# Patient Record
Sex: FEMALE | Race: WHITE
[De-identification: names, ages, dates, MRNs, and addresses within clinical notes are randomized per-mention and may not be internally consistent; named-entity substitution may affect disease eponyms.]

---

## 2017-09-14 ENCOUNTER — HOSPITAL ENCOUNTER (OUTPATIENT)
Dept: HOSPITAL 53 - M OPP | Age: 67
Discharge: HOME | End: 2017-09-14
Attending: SURGERY
Payer: MEDICARE

## 2017-09-14 VITALS — DIASTOLIC BLOOD PRESSURE: 80 MMHG | SYSTOLIC BLOOD PRESSURE: 113 MMHG

## 2017-09-14 VITALS — HEIGHT: 64 IN | WEIGHT: 170 LBS | BODY MASS INDEX: 29.02 KG/M2

## 2017-09-14 DIAGNOSIS — K64.2: ICD-10-CM

## 2017-09-14 DIAGNOSIS — K64.1: ICD-10-CM

## 2017-09-14 DIAGNOSIS — D12.7: ICD-10-CM

## 2017-09-14 DIAGNOSIS — Z86.19: ICD-10-CM

## 2017-09-14 DIAGNOSIS — Z80.8: ICD-10-CM

## 2017-09-14 DIAGNOSIS — Z12.11: Primary | ICD-10-CM

## 2017-09-14 DIAGNOSIS — D12.0: ICD-10-CM

## 2017-09-14 DIAGNOSIS — Z79.899: ICD-10-CM

## 2017-09-14 DIAGNOSIS — Z78.0: ICD-10-CM

## 2017-09-14 DIAGNOSIS — K57.30: ICD-10-CM

## 2017-09-14 DIAGNOSIS — Z87.891: ICD-10-CM

## 2017-09-14 DIAGNOSIS — Z80.49: ICD-10-CM

## 2017-09-14 NOTE — ROOR
________________________________________________________________________________

Patient Name: Emily Mcbride               Procedure Date: 9/14/2017 10:34 AM

MRN: [id number]                          Account Number: [id number]

YOB: 1950                Age: 67

Room: [facility]                            Gender: Female

Note Status: Finalized                 

________________________________________________________________________________

 

Procedure:           Colonoscopy

Indications:         Screening for colorectal malignant neoplasm

Providers:           Leo J. Gosselin Jr, MD

Referring MD:        GAGANDEEP BURGER DO

Requesting Provider: 

Medicines:           Propofol per Anesthesia

Complications:       No immediate complications.

________________________________________________________________________________

Procedure:           Pre-Anesthesia Assessment:

                     - Prior to the procedure, a History and Physical was 

                     performed, and patient medications and allergies were 

                     reviewed. The patient is competent. The risks and 

                     benefits of the procedure and the sedation options and 

                     risks were discussed with the patient. All questions were 

                     answered and informed consent was obtained. Patient 

                     identification and proposed procedure were verified by 

                     the physician and the nurse in the pre-procedure area and 

                     in the procedure room. Mental Status Examination: alert 

                     and oriented. Airway Examination: normal oropharyngeal 

                     airway and neck mobility. Respiratory Examination: clear 

                     to auscultation. CV Examination: normal. ASA Grade 

                     Assessment: II - A patient with mild systemic disease. 

                     After reviewing the risks and benefits, the patient was 

                     deemed in satisfactory condition to undergo the 

                     procedure. The anesthesia plan was to use moderate 

                     sedation / analgesia (conscious sedation). Immediately 

                     prior to administration of medications, the patient was 

                     re-assessed for adequacy to receive sedatives. The heart 

                     rate, respiratory rate, oxygen saturations, blood 

                     pressure, adequacy of pulmonary ventilation, and response 

                     to care were monitored throughout the procedure. The 

                     physical status of the patient was re-assessed after the 

                     procedure.

                     The Colonoscope was introduced through the anus and 

                     advanced to the cecum, identified by appendiceal orifice 

                     and ileocecal valve. The colonoscopy was performed 

                     without difficulty. The patient tolerated the procedure 

                     well. The quality of the bowel preparation was adequate 

                     and good.

                                                                                

Findings:

     The perianal exam findings include non-thrombosed internal hemorrhoids, 

     internal hemorrhoids that prolapse with straining, but spontaneously 

     regress to the resting position (Grade II) and internal hemorrhoids that 

     prolapse with straining, but require manual replacement into the anal 

     canal (Grade III).

     Multiple small-mouthed diverticula were found in the sigmoid colon.

     Multiple small and large-mouthed diverticula were found in the transverse 

     colon and ascending colon.

     A medium polyp was found in the ileocecal valve. The polyp was 

     hyperplastic and sessile. The polyp was removed with a hot snare. 

     Resection and retrieval were complete.

     A medium polyp was found in the recto-sigmoid colon. The polyp was 

     removed with a hot snare. Resection and retrieval were complete.

     The rectum, descending colon, cecum and appendiceal orifice appeared 

     normal.

                                                                                

Impression:          - Non-thrombosed internal hemorrhoids, internal 

                     hemorrhoids that prolapse with straining, but 

                     spontaneously regress to the resting position (Grade II) 

                     and internal hemorrhoids that prolapse with straining, 

                     but require manual replacement into the anal canal (Grade 

                     III) found on perianal exam.

                     - Diverticulosis in the sigmoid colon.

                     - Diverticulosis in the transverse colon and in the 

                     ascending colon.

                     - One medium polyp at the ileocecal valve, removed with a 

                     hot snare. Resected and retrieved.

                     - One medium polyp at the recto-sigmoid colon, removed 

                     with a hot snare. Resected and retrieved.

                     - The rectum, descending colon, cecum and appendiceal 

                     orifice are normal.

Recommendation:      - Discharge patient to home (ambulatory).

                     - Repeat colonoscopy in 3 - 5 years for surveillance 

                     based on pathology results.

                                                                                

 

Leo Gosselin MD

_____________________

Leo J Gosselin Jr, MD

9/14/2017 11:14:08 AM

This report has been signed electronically.

Number of Addenda: 0

 

Note Initiated On: 9/14/2017 10:34 AM

Estimated Blood Loss:

     Estimated blood loss: none.

## 2017-12-28 ENCOUNTER — HOSPITAL ENCOUNTER (OUTPATIENT)
Dept: HOSPITAL 53 - M RAD | Age: 67
End: 2017-12-28
Attending: NURSE PRACTITIONER
Payer: MEDICARE

## 2017-12-28 DIAGNOSIS — Z12.31: Primary | ICD-10-CM

## 2018-03-02 ENCOUNTER — HOSPITAL ENCOUNTER (OUTPATIENT)
Dept: HOSPITAL 53 - M RAD | Age: 68
End: 2018-03-02
Attending: NURSE PRACTITIONER
Payer: MEDICARE

## 2018-03-02 DIAGNOSIS — N63.10: Primary | ICD-10-CM

## 2018-03-02 PROCEDURE — 77065 DX MAMMO INCL CAD UNI: CPT

## 2020-11-01 ENCOUNTER — HOSPITAL ENCOUNTER (OUTPATIENT)
Dept: HOSPITAL 53 - M LABSMTC | Age: 70
End: 2020-11-01
Attending: PEDIATRICS
Payer: SELF-PAY

## 2020-11-01 DIAGNOSIS — Z20.828: Primary | ICD-10-CM

## 2021-11-18 ENCOUNTER — HOSPITAL ENCOUNTER (OUTPATIENT)
Dept: HOSPITAL 53 - M LABSMTC | Age: 71
End: 2021-11-18
Attending: ANESTHESIOLOGY
Payer: MEDICARE

## 2021-11-18 DIAGNOSIS — Z20.822: ICD-10-CM

## 2021-11-18 DIAGNOSIS — Z01.812: Primary | ICD-10-CM

## 2021-11-23 ENCOUNTER — HOSPITAL ENCOUNTER (OUTPATIENT)
Dept: HOSPITAL 53 - M OPP | Age: 71
Discharge: HOME | End: 2021-11-23
Attending: SURGERY
Payer: MEDICARE

## 2021-11-23 VITALS — HEIGHT: 65 IN | BODY MASS INDEX: 31.49 KG/M2 | WEIGHT: 189 LBS

## 2021-11-23 VITALS — SYSTOLIC BLOOD PRESSURE: 135 MMHG | DIASTOLIC BLOOD PRESSURE: 68 MMHG

## 2021-11-23 DIAGNOSIS — Z79.899: ICD-10-CM

## 2021-11-23 DIAGNOSIS — K57.30: Primary | ICD-10-CM

## 2021-11-23 DIAGNOSIS — Z86.010: ICD-10-CM

## 2021-11-23 NOTE — ROOR
________________________________________________________________________________

Patient Name: Emily Mcbride               Procedure Date: 11/23/2021 11:48 AM

MRN: [id number]                          Account Number: [id number]

YOB: 1950                Age: 71

Room: [facility]                            Gender: Female

Note Status: Finalized                 

________________________________________________________________________________

 

Procedure:            Colonoscopy

Indications:          High risk colon cancer surveillance: Personal history of 

                      colonic polyps

Providers:            Leo J. Gosselin Jr, MD

Referring MD:         GAGANDEEP BURGER DO

Requesting Provider:  

Medicines:            Propofol per Anesthesia

Complications:        No immediate complications.

________________________________________________________________________________

Procedure:            Pre-Anesthesia Assessment:

                      - Prior to the procedure, a History and Physical was 

                      performed, and patient medications and allergies were 

                      reviewed. The patient is competent. The risks and 

                      benefits of the procedure and the sedation options and 

                      risks were discussed with the patient. All questions 

                      were answered and informed consent was obtained. Patient 

                      identification and proposed procedure were verified by 

                      the physician and the nurse in the pre-procedure area 

                      and in the procedure room. Mental Status Examination: 

                      alert and oriented. Airway Examination: normal 

                      oropharyngeal airway and neck mobility. Respiratory 

                      Examination: clear to auscultation. CV Examination: 

                      normal. ASA Grade Assessment: II - A patient with mild 

                      systemic disease. After reviewing the risks and 

                      benefits, the patient was deemed in satisfactory 

                      condition to undergo the procedure. The anesthesia plan 

                      was to use moderate sedation / analgesia (conscious 

                      sedation). Immediately prior to administration of 

                      medications, the patient was re-assessed for adequacy to 

                      receive sedatives. The heart rate, respiratory rate, 

                      oxygen saturations, blood pressure, adequacy of 

                      pulmonary ventilation, and response to care were 

                      monitored throughout the procedure. The physical status 

                      of the patient was re-assessed after the procedure.

                      The Colonoscope was introduced through the anus and 

                      advanced to the cecum, identified by appendiceal orifice 

                      and ileocecal valve. The colonoscopy was performed 

                      without difficulty. The patient tolerated the procedure 

                      well. The quality of the bowel preparation was adequate.

                                                                                

Findings:

     The rectum, recto-sigmoid colon, descending colon, transverse colon, 

     cecum, appendiceal orifice and ileocecal valve appeared normal.

     Multiple small and large-mouthed diverticula were found in the sigmoid 

     colon and ascending colon.

                                                                                

Impression:           - The rectum, recto-sigmoid colon, descending colon, 

                      transverse colon, cecum, appendiceal orifice and 

                      ileocecal valve are normal.

                      - Diverticulosis in the sigmoid colon and in the 

                      ascending colon.

                      - No specimens collected.

Recommendation:       - Discharge patient to home (ambulatory).

                      - Repeat colonoscopy in 5 years for surveillance.

                                                                                

Procedure Code(s):    --- Professional ---

                      55552, Colonoscopy, flexible; diagnostic, including 

                      collection of specimen(s) by brushing or washing, when 

                      performed (separate procedure)

Diagnosis Code(s):    --- Professional ---

                      Z86.010, Personal history of colonic polyps

                      K57.30, Diverticulosis of large intestine without 

                      perforation or abscess without bleeding

 

CPT copyright 2019 [employer]. All rights reserved.

 

The codes documented in this report are preliminary and upon  review may 

be revised to meet current compliance requirements.

 

Leo Gosselin MD

_____________________

Leo J Gosselin Jr, MD

11/23/2021 12:07:35 PM

Electronically signed by Leo Gosselin Jr , MD

Number of Addenda: 0

 

Note Initiated On: 11/23/2021 11:48 AM

Estimated Blood Loss: Estimated blood loss: none.